# Patient Record
Sex: FEMALE | Race: WHITE | Employment: FULL TIME | URBAN - METROPOLITAN AREA
[De-identification: names, ages, dates, MRNs, and addresses within clinical notes are randomized per-mention and may not be internally consistent; named-entity substitution may affect disease eponyms.]

---

## 2018-03-05 PROBLEM — L21.8 OTHER SEBORRHEIC DERMATITIS: Status: ACTIVE | Noted: 2018-03-05

## 2018-03-05 PROBLEM — L91.8 SKIN TAG: Status: ACTIVE | Noted: 2018-03-05

## 2018-03-05 PROBLEM — L82.1 OTHER SEBORRHEIC KERATOSIS: Status: ACTIVE | Noted: 2018-03-05

## 2020-01-30 ENCOUNTER — OFFICE VISIT (OUTPATIENT)
Dept: PODIATRY CLINIC | Facility: CLINIC | Age: 60
End: 2020-01-30
Payer: COMMERCIAL

## 2020-01-30 DIAGNOSIS — L60.0 INGROWN TOENAIL: ICD-10-CM

## 2020-01-30 DIAGNOSIS — B35.1 ONYCHOMYCOSIS: Primary | ICD-10-CM

## 2020-01-30 PROCEDURE — 99213 OFFICE O/P EST LOW 20 MIN: CPT | Performed by: PODIATRIST

## 2020-01-30 NOTE — PROGRESS NOTES
Linh Casillas is a 60year old female. Patient presents with:  Toenail Fungus: Right hallux. Patient has tried oral treatment in the past and ended up having reaction hives. Patient stopped treatment and now is wondering what can she do.  Patient state Smoker    Substance and Sexual Activity      Alcohol use: Yes          REVIEW OF SYSTEMS:   Review of Systems    Today reviewed systems as documented below  GENERAL HEALTH: feels well otherwise  SKIN: denies any unusual skin lesions or rashes  RESPIRATORY: